# Patient Record
Sex: FEMALE | Race: WHITE | Employment: OTHER | URBAN - METROPOLITAN AREA
[De-identification: names, ages, dates, MRNs, and addresses within clinical notes are randomized per-mention and may not be internally consistent; named-entity substitution may affect disease eponyms.]

---

## 2017-03-15 ENCOUNTER — HOSPITAL ENCOUNTER (OUTPATIENT)
Dept: WOMENS IMAGING | Age: 77
Discharge: HOME | End: 2017-03-15
Attending: FAMILY MEDICINE | Admitting: FAMILY MEDICINE

## 2017-03-15 DIAGNOSIS — Z12.31 VISIT FOR SCREENING MAMMOGRAM: ICD-10-CM

## 2018-03-13 ENCOUNTER — HOSPITAL ENCOUNTER (OUTPATIENT)
Dept: WOMENS IMAGING | Age: 78
Discharge: HOME | End: 2018-03-13
Attending: FAMILY MEDICINE | Admitting: FAMILY MEDICINE

## 2018-03-13 DIAGNOSIS — Z12.39 BREAST CANCER SCREENING: ICD-10-CM

## 2018-08-03 ENCOUNTER — HOSPITAL ENCOUNTER (OUTPATIENT)
Dept: WOMENS IMAGING | Age: 78
Discharge: HOME | End: 2018-08-03
Attending: FAMILY MEDICINE | Admitting: FAMILY MEDICINE

## 2018-08-03 DIAGNOSIS — M81.0 OSTEOPOROSIS, POST-MENOPAUSAL: ICD-10-CM

## 2018-08-03 DIAGNOSIS — N95.1 FEMALE CLIMACTERIC STATE: ICD-10-CM

## 2019-03-30 ENCOUNTER — HOSPITAL ENCOUNTER (OUTPATIENT)
Dept: WOMENS IMAGING | Age: 79
Discharge: HOME | End: 2019-03-30
Payer: MEDICARE

## 2019-03-30 DIAGNOSIS — Z12.39 BREAST CANCER SCREENING: ICD-10-CM

## 2019-03-30 PROCEDURE — 77063 BREAST TOMOSYNTHESIS BI: CPT

## 2020-06-05 ENCOUNTER — HOSPITAL ENCOUNTER (OUTPATIENT)
Dept: WOMENS IMAGING | Age: 80
Discharge: HOME | End: 2020-06-05
Payer: MEDICARE

## 2020-06-05 PROCEDURE — 77067 SCR MAMMO BI INCL CAD: CPT

## 2021-06-12 ENCOUNTER — HOSPITAL ENCOUNTER (OUTPATIENT)
Dept: WOMENS IMAGING | Age: 81
Discharge: HOME | End: 2021-06-12
Payer: MEDICARE

## 2021-06-12 DIAGNOSIS — Z12.31 BREAST CANCER SCREENING BY MAMMOGRAM: ICD-10-CM

## 2021-06-12 PROCEDURE — 77063 BREAST TOMOSYNTHESIS BI: CPT

## 2022-06-16 ENCOUNTER — HOSPITAL ENCOUNTER (OUTPATIENT)
Dept: WOMENS IMAGING | Age: 82
Discharge: HOME | End: 2022-06-16
Payer: MEDICARE

## 2022-06-16 DIAGNOSIS — Z12.31 BREAST CANCER SCREENING BY MAMMOGRAM: ICD-10-CM

## 2022-06-16 PROCEDURE — 77063 BREAST TOMOSYNTHESIS BI: CPT

## 2024-07-02 ENCOUNTER — HOSPITAL ENCOUNTER (OUTPATIENT)
Dept: WOMENS IMAGING | Age: 84
Discharge: HOME | End: 2024-07-02
Payer: MEDICARE

## 2024-07-02 DIAGNOSIS — Z12.31 BREAST CANCER SCREENING BY MAMMOGRAM: ICD-10-CM

## 2024-07-02 PROCEDURE — 77063 BREAST TOMOSYNTHESIS BI: CPT

## 2025-05-26 ENCOUNTER — HOSPITAL ENCOUNTER (INPATIENT)
Age: 85
LOS: 1 days | Discharge: HOME | DRG: 885 | End: 2025-05-28
Attending: HOSPITALIST | Admitting: HOSPITALIST
Payer: MEDICARE

## 2025-05-26 ENCOUNTER — APPOINTMENT (OUTPATIENT)
Dept: CT IMAGING | Age: 85
DRG: 885 | End: 2025-05-26
Payer: MEDICARE

## 2025-05-26 DIAGNOSIS — R44.3 HALLUCINATIONS: Primary | ICD-10-CM

## 2025-05-26 LAB
ANION GAP SERPL CALCULATED.3IONS-SCNC: 15 MMOL/L (ref 3–16)
APAP SERPL-MCNC: <5 UG/ML (ref 10–30)
BASOPHILS # BLD: 0.1 K/UL (ref 0–0.2)
BASOPHILS NFR BLD: 0.7 %
BUN SERPL-MCNC: 19 MG/DL (ref 7–20)
CALCIUM SERPL-MCNC: 9 MG/DL (ref 8.3–10.6)
CHLORIDE SERPL-SCNC: 103 MMOL/L (ref 99–110)
CO2 SERPL-SCNC: 21 MMOL/L (ref 21–32)
CREAT SERPL-MCNC: 0.9 MG/DL (ref 0.6–1.2)
DEPRECATED RDW RBC AUTO: 16.4 % (ref 12.4–15.4)
EOSINOPHIL # BLD: 0.1 K/UL (ref 0–0.6)
EOSINOPHIL NFR BLD: 0.5 %
ETHANOLAMINE SERPL-MCNC: NORMAL MG/DL (ref 0–0.08)
GFR SERPLBLD CREATININE-BSD FMLA CKD-EPI: 62 ML/MIN/{1.73_M2}
GLUCOSE SERPL-MCNC: 120 MG/DL (ref 70–99)
HCT VFR BLD AUTO: 45.7 % (ref 36–48)
HGB BLD-MCNC: 15.1 G/DL (ref 12–16)
LYMPHOCYTES # BLD: 1.9 K/UL (ref 1–5.1)
LYMPHOCYTES NFR BLD: 17.8 %
MCH RBC QN AUTO: 29.1 PG (ref 26–34)
MCHC RBC AUTO-ENTMCNC: 33 G/DL (ref 31–36)
MCV RBC AUTO: 88.3 FL (ref 80–100)
MONOCYTES # BLD: 0.9 K/UL (ref 0–1.3)
MONOCYTES NFR BLD: 8.1 %
NEUTROPHILS # BLD: 7.9 K/UL (ref 1.7–7.7)
NEUTROPHILS NFR BLD: 72.9 %
PLATELET # BLD AUTO: 168 K/UL (ref 135–450)
PMV BLD AUTO: 9.8 FL (ref 5–10.5)
POTASSIUM SERPL-SCNC: 3.6 MMOL/L (ref 3.5–5.1)
RBC # BLD AUTO: 5.17 M/UL (ref 4–5.2)
SALICYLATES SERPL-MCNC: <0.5 MG/DL (ref 15–30)
SODIUM SERPL-SCNC: 139 MMOL/L (ref 136–145)
TSH SERPL DL<=0.005 MIU/L-ACNC: 1.31 UIU/ML (ref 0.27–4.2)
WBC # BLD AUTO: 10.8 K/UL (ref 4–11)

## 2025-05-26 PROCEDURE — 84443 ASSAY THYROID STIM HORMONE: CPT

## 2025-05-26 PROCEDURE — 80143 DRUG ASSAY ACETAMINOPHEN: CPT

## 2025-05-26 PROCEDURE — 82140 ASSAY OF AMMONIA: CPT

## 2025-05-26 PROCEDURE — 82077 ASSAY SPEC XCP UR&BREATH IA: CPT

## 2025-05-26 PROCEDURE — 70450 CT HEAD/BRAIN W/O DYE: CPT

## 2025-05-26 PROCEDURE — 36415 COLL VENOUS BLD VENIPUNCTURE: CPT

## 2025-05-26 PROCEDURE — 85025 COMPLETE CBC W/AUTO DIFF WBC: CPT

## 2025-05-26 PROCEDURE — 80048 BASIC METABOLIC PNL TOTAL CA: CPT

## 2025-05-26 PROCEDURE — 99285 EMERGENCY DEPT VISIT HI MDM: CPT

## 2025-05-26 PROCEDURE — 80179 DRUG ASSAY SALICYLATE: CPT

## 2025-05-26 RX ORDER — TRIAMTERENE AND HYDROCHLOROTHIAZIDE 37.5; 25 MG/1; MG/1
1 TABLET ORAL DAILY
COMMUNITY

## 2025-05-26 RX ORDER — ASPIRIN 81 MG/1
81 TABLET, CHEWABLE ORAL DAILY
COMMUNITY

## 2025-05-26 RX ORDER — ALLOPURINOL 100 MG/1
200 TABLET ORAL DAILY
COMMUNITY

## 2025-05-26 RX ORDER — SIMVASTATIN 20 MG
20 TABLET ORAL NIGHTLY
COMMUNITY

## 2025-05-26 RX ORDER — CALCIUM CARBONATE 500(1250)
500 TABLET ORAL DAILY
COMMUNITY

## 2025-05-26 RX ORDER — TRAZODONE HYDROCHLORIDE 50 MG/1
50 TABLET ORAL NIGHTLY
Status: ON HOLD | COMMUNITY
End: 2025-05-28 | Stop reason: HOSPADM

## 2025-05-26 ASSESSMENT — PAIN - FUNCTIONAL ASSESSMENT: PAIN_FUNCTIONAL_ASSESSMENT: NONE - DENIES PAIN

## 2025-05-27 PROBLEM — E53.8 VITAMIN B12 DEFICIENCY: Status: ACTIVE | Noted: 2025-05-27

## 2025-05-27 PROBLEM — R44.3 HALLUCINATIONS: Status: ACTIVE | Noted: 2025-05-27

## 2025-05-27 PROBLEM — I10 HYPERTENSION: Status: ACTIVE | Noted: 2025-05-27

## 2025-05-27 PROBLEM — E55.9 VITAMIN D DEFICIENCY: Status: ACTIVE | Noted: 2025-05-27

## 2025-05-27 LAB
25(OH)D3 SERPL-MCNC: 22.1 NG/ML
AMMONIA PLAS-SCNC: 31 UMOL/L (ref 11–51)
AMPHETAMINES UR QL SCN>1000 NG/ML: NORMAL
BARBITURATES UR QL SCN>200 NG/ML: NORMAL
BENZODIAZ UR QL SCN>200 NG/ML: NORMAL
BILIRUB UR QL STRIP.AUTO: NEGATIVE
CANNABINOIDS UR QL SCN>50 NG/ML: NORMAL
CLARITY UR: CLEAR
COCAINE UR QL SCN: NORMAL
COLOR UR: YELLOW
DRUG SCREEN COMMENT UR-IMP: NORMAL
FENTANYL SCREEN, URINE: NORMAL
GLUCOSE UR STRIP.AUTO-MCNC: NEGATIVE MG/DL
HGB UR QL STRIP.AUTO: NEGATIVE
KETONES UR STRIP.AUTO-MCNC: ABNORMAL MG/DL
LEUKOCYTE ESTERASE UR QL STRIP.AUTO: NEGATIVE
METHADONE UR QL SCN>300 NG/ML: NORMAL
NITRITE UR QL STRIP.AUTO: NEGATIVE
OPIATES UR QL SCN>300 NG/ML: NORMAL
OXYCODONE UR QL SCN: NORMAL
PCP UR QL SCN>25 NG/ML: NORMAL
PH UR STRIP.AUTO: 6 [PH] (ref 5–8)
PH UR STRIP: 6 [PH]
PROT UR STRIP.AUTO-MCNC: NEGATIVE MG/DL
SP GR UR STRIP.AUTO: 1.03 (ref 1–1.03)
UA COMPLETE W REFLEX CULTURE PNL UR: ABNORMAL
UA DIPSTICK W REFLEX MICRO PNL UR: ABNORMAL
URN SPEC COLLECT METH UR: ABNORMAL
UROBILINOGEN UR STRIP-ACNC: 1 E.U./DL
VIT B12 SERPL-MCNC: 241 PG/ML (ref 211–911)

## 2025-05-27 PROCEDURE — 94760 N-INVAS EAR/PLS OXIMETRY 1: CPT

## 2025-05-27 PROCEDURE — G0378 HOSPITAL OBSERVATION PER HR: HCPCS

## 2025-05-27 PROCEDURE — 36415 COLL VENOUS BLD VENIPUNCTURE: CPT

## 2025-05-27 PROCEDURE — 80307 DRUG TEST PRSMV CHEM ANLYZR: CPT

## 2025-05-27 PROCEDURE — 99222 1ST HOSP IP/OBS MODERATE 55: CPT | Performed by: NURSE PRACTITIONER

## 2025-05-27 PROCEDURE — 82306 VITAMIN D 25 HYDROXY: CPT

## 2025-05-27 PROCEDURE — 81003 URINALYSIS AUTO W/O SCOPE: CPT

## 2025-05-27 PROCEDURE — 2500000003 HC RX 250 WO HCPCS: Performed by: HOSPITALIST

## 2025-05-27 PROCEDURE — 6370000000 HC RX 637 (ALT 250 FOR IP): Performed by: HOSPITALIST

## 2025-05-27 PROCEDURE — 82607 VITAMIN B-12: CPT

## 2025-05-27 RX ORDER — ENOXAPARIN SODIUM 100 MG/ML
40 INJECTION SUBCUTANEOUS DAILY
Status: DISCONTINUED | OUTPATIENT
Start: 2025-05-27 | End: 2025-05-28 | Stop reason: HOSPADM

## 2025-05-27 RX ORDER — HYDROXYZINE HYDROCHLORIDE 25 MG/1
25 TABLET, FILM COATED ORAL ONCE AS NEEDED
Status: DISCONTINUED | OUTPATIENT
Start: 2025-05-27 | End: 2025-05-28 | Stop reason: HOSPADM

## 2025-05-27 RX ORDER — POLYETHYLENE GLYCOL 3350 17 G/17G
17 POWDER, FOR SOLUTION ORAL DAILY PRN
Status: DISCONTINUED | OUTPATIENT
Start: 2025-05-27 | End: 2025-05-28 | Stop reason: HOSPADM

## 2025-05-27 RX ORDER — MAGNESIUM SULFATE IN WATER 40 MG/ML
2000 INJECTION, SOLUTION INTRAVENOUS PRN
Status: DISCONTINUED | OUTPATIENT
Start: 2025-05-27 | End: 2025-05-28 | Stop reason: HOSPADM

## 2025-05-27 RX ORDER — VITAMIN B COMPLEX
2000 TABLET ORAL DAILY
Status: DISCONTINUED | OUTPATIENT
Start: 2025-05-27 | End: 2025-05-28 | Stop reason: HOSPADM

## 2025-05-27 RX ORDER — SODIUM CHLORIDE 9 MG/ML
INJECTION, SOLUTION INTRAVENOUS PRN
Status: DISCONTINUED | OUTPATIENT
Start: 2025-05-27 | End: 2025-05-28 | Stop reason: HOSPADM

## 2025-05-27 RX ORDER — CALCIUM CARBONATE 500(1250)
500 TABLET ORAL DAILY
Status: DISCONTINUED | OUTPATIENT
Start: 2025-05-27 | End: 2025-05-28 | Stop reason: HOSPADM

## 2025-05-27 RX ORDER — POTASSIUM CHLORIDE 7.45 MG/ML
10 INJECTION INTRAVENOUS PRN
Status: DISCONTINUED | OUTPATIENT
Start: 2025-05-27 | End: 2025-05-28 | Stop reason: HOSPADM

## 2025-05-27 RX ORDER — POTASSIUM CHLORIDE 1500 MG/1
40 TABLET, EXTENDED RELEASE ORAL PRN
Status: DISCONTINUED | OUTPATIENT
Start: 2025-05-27 | End: 2025-05-28 | Stop reason: HOSPADM

## 2025-05-27 RX ORDER — SODIUM CHLORIDE 0.9 % (FLUSH) 0.9 %
5-40 SYRINGE (ML) INJECTION PRN
Status: DISCONTINUED | OUTPATIENT
Start: 2025-05-27 | End: 2025-05-28 | Stop reason: HOSPADM

## 2025-05-27 RX ORDER — ACETAMINOPHEN 650 MG/1
650 SUPPOSITORY RECTAL EVERY 6 HOURS PRN
Status: DISCONTINUED | OUTPATIENT
Start: 2025-05-27 | End: 2025-05-28 | Stop reason: HOSPADM

## 2025-05-27 RX ORDER — ATORVASTATIN CALCIUM 10 MG/1
10 TABLET, FILM COATED ORAL NIGHTLY
Status: DISCONTINUED | OUTPATIENT
Start: 2025-05-27 | End: 2025-05-28 | Stop reason: HOSPADM

## 2025-05-27 RX ORDER — SODIUM CHLORIDE 0.9 % (FLUSH) 0.9 %
5-40 SYRINGE (ML) INJECTION EVERY 12 HOURS SCHEDULED
Status: DISCONTINUED | OUTPATIENT
Start: 2025-05-27 | End: 2025-05-28 | Stop reason: HOSPADM

## 2025-05-27 RX ORDER — ONDANSETRON 2 MG/ML
4 INJECTION INTRAMUSCULAR; INTRAVENOUS EVERY 6 HOURS PRN
Status: DISCONTINUED | OUTPATIENT
Start: 2025-05-27 | End: 2025-05-28 | Stop reason: HOSPADM

## 2025-05-27 RX ORDER — ASPIRIN 81 MG/1
81 TABLET, CHEWABLE ORAL DAILY
Status: DISCONTINUED | OUTPATIENT
Start: 2025-05-27 | End: 2025-05-28 | Stop reason: HOSPADM

## 2025-05-27 RX ORDER — ONDANSETRON 4 MG/1
4 TABLET, ORALLY DISINTEGRATING ORAL EVERY 8 HOURS PRN
Status: DISCONTINUED | OUTPATIENT
Start: 2025-05-27 | End: 2025-05-28 | Stop reason: HOSPADM

## 2025-05-27 RX ORDER — ACETAMINOPHEN 325 MG/1
650 TABLET ORAL EVERY 6 HOURS PRN
Status: DISCONTINUED | OUTPATIENT
Start: 2025-05-27 | End: 2025-05-28 | Stop reason: HOSPADM

## 2025-05-27 RX ORDER — ALLOPURINOL 100 MG/1
200 TABLET ORAL DAILY
Status: DISCONTINUED | OUTPATIENT
Start: 2025-05-27 | End: 2025-05-28 | Stop reason: HOSPADM

## 2025-05-27 RX ADMIN — Medication 2000 UNITS: at 10:46

## 2025-05-27 RX ADMIN — SODIUM CHLORIDE, PRESERVATIVE FREE 10 ML: 5 INJECTION INTRAVENOUS at 08:34

## 2025-05-27 RX ADMIN — CALCIUM 500 MG: 500 TABLET ORAL at 10:46

## 2025-05-27 RX ADMIN — SERTRALINE 50 MG: 50 TABLET, FILM COATED ORAL at 10:46

## 2025-05-27 RX ADMIN — ATORVASTATIN CALCIUM 10 MG: 10 TABLET, FILM COATED ORAL at 20:25

## 2025-05-27 RX ADMIN — ASPIRIN 81 MG: 81 TABLET, CHEWABLE ORAL at 10:46

## 2025-05-27 RX ADMIN — SODIUM CHLORIDE, PRESERVATIVE FREE 10 ML: 5 INJECTION INTRAVENOUS at 20:26

## 2025-05-27 RX ADMIN — ALLOPURINOL 200 MG: 100 TABLET ORAL at 20:25

## 2025-05-27 ASSESSMENT — PAIN SCALES - GENERAL
PAINLEVEL_OUTOF10: 0

## 2025-05-27 ASSESSMENT — PAIN - FUNCTIONAL ASSESSMENT: PAIN_FUNCTIONAL_ASSESSMENT: 0-10

## 2025-05-27 NOTE — PLAN OF CARE
Problem: Discharge Planning  Goal: Discharge to home or other facility with appropriate resources  Outcome: Progressing  Flowsheets  Taken 5/27/2025 0158  Discharge to home or other facility with appropriate resources:   Identify barriers to discharge with patient and caregiver   Arrange for needed discharge resources and transportation as appropriate  Taken 5/27/2025 0142  Discharge to home or other facility with appropriate resources:   Identify barriers to discharge with patient and caregiver   Arrange for needed discharge resources and transportation as appropriate     Problem: Pain  Goal: Verbalizes/displays adequate comfort level or baseline comfort level  Outcome: Progressing     Problem: Safety - Adult  Goal: Free from fall injury  Outcome: Progressing     Problem: ABCDS Injury Assessment  Goal: Absence of physical injury  Outcome: Progressing     Problem: Skin/Tissue Integrity - Adult  Goal: Skin integrity remains intact  Outcome: Progressing  Flowsheets (Taken 5/27/2025 0142)  Skin Integrity Remains Intact:   Monitor for areas of redness and/or skin breakdown   Assess vascular access sites hourly

## 2025-05-27 NOTE — CONSULTS
Neurology Consult Note  Reason for Consult: hallucinations    Chief complaint: feel threatened in home    Chelly Aparicio MD asked me to see Rosalia Grant in consultation for evaluation of hallucinations    History of Present Illness:  Rosalia Grant is a 84 y.o. female who presents with confusion, fear, hallucinations.     I obtained my information via interview w/ the patient and her daughter at the bedside, supplemented by chart review.     Around February/March, the patient and her daughter started to notice some changes at night.  When this started, the patient had sold her house and moved into a [employer] w/ her daughter.  Her daughter then went back to work in the office full time.  Apparently they found out that the previous owner had passed in the home which made her weary.      The patient started having some trouble at night.  He was having nightmares and night terrors.  She would visually hallucinate.  Apparently at one point she was convinced that someone was in the home and rearranged the furniture when she had been sleeping.  Her daughter confirmed the furniture was where it should be.  She has been feeling threatened to even be in her home.      Initially this had improved.  In recent weeks they started to pick back up.  She has been seeing her PCP about this.  She was given a prescription of Trazodone which knocked her out.  She ended up coming to the ED yesterday to be evaluated.    Currently she feels OK.  She says that she had a couple of episodes where her entire body felt warm which was odd for her.      She is otherwise a highly functional person (does finances, cooks meals, laundry, etc.).    Medical History:  Past Medical History:   Diagnosis Date    Cancer (HCC) 2024    Skin cancer removed    Hyperlipidemia     Hypertension      Past Surgical History:   Procedure Laterality Date    APPENDECTOMY      CHOLECYSTECTOMY      COLONOSCOPY      HYSTERECTOMY (CERVIX STATUS UNKNOWN)      SKIN

## 2025-05-27 NOTE — PROGRESS NOTES
4 Eyes Skin Assessment     NAME:  Rosalia Grant  YOB: 1940  MEDICAL RECORD NUMBER:  [id number]    The patient is being assessed for  Admission    I agree that at least one RN has performed a thorough Head to Toe Skin Assessment on the patient. ALL assessment sites listed below have been assessed.      Areas assessed by both nurses:    Head, Face, Ears, Shoulders, Back, Chest, Arms, Elbows, Hands, Sacrum. Buttock, Coccyx, Ischium, Legs. Feet and Heels, and Under Medical Devices         Does the Patient have a Wound? No noted wound(s)       Mayank Prevention initiated by RN: No  Wound Care Orders initiated by RN: No    Pressure Injury (Stage 3,4, Unstageable, DTI, NWPT, and Complex wounds) if present, place Wound referral order by RN under : No    New Ostomies, if present place, Ostomy referral order under : No     Nurse 1 eSignature: Electronically signed by Vu Baltazar RN on 5/27/25 at 3:30 AM EDT    **SHARE this note so that the co-signing nurse can place an eSignature**    Nurse 2 eSignature: Electronically signed by Heather Torres RN on 5/27/25 at 6:32 AM EDT

## 2025-05-27 NOTE — ED NOTES
ED TO INPATIENT SBAR HANDOFF    Patient Name: Rosalia Grant   Preferred Name: Rosalia  : 1940  84 y.o.   Family/Caregiver Present: no   Code Status Order: No Order  PO Status: NPO:No  Telemetry Order: No  C-SSRS:    Sitter no   Restraints:     Sepsis Risk Score      Situation  Chief Complaint   Patient presents with    Hallucinations     Hallucinations since march, increasing this month, and the greatly increased over the last few days. Primary has been relating this to anxiety. Patient reports seeing men come into the house drugging her. She states she has been told this is her imagination. Patient started on zoloft today.      Brief Description of Patient's Condition: Please see triage note  Mental Status: oriented and alert  Arrived from:Home  Imaging:   CT HEAD WO CONTRAST   Final Result   No acute intracranial abnormality.           Abnormal labs:   Abnormal Labs Reviewed   CBC WITH AUTO DIFFERENTIAL - Abnormal; Notable for the following components:       Result Value    RDW 16.4 (*)     Neutrophils Absolute 7.9 (*)     All other components within normal limits   BASIC METABOLIC PANEL W/ REFLEX TO MG FOR LOW K - Abnormal; Notable for the following components:    Glucose 120 (*)     All other components within normal limits   SALICYLATE LEVEL - Abnormal; Notable for the following components:    Salicylate Lvl <0.5 (*)     All other components within normal limits   ACETAMINOPHEN LEVEL - Abnormal; Notable for the following components:    Acetaminophen Level <5 (*)     All other components within normal limits       Background  Allergies:   Allergies   Allergen Reactions    Amoxicillin     Cefuroxime     Cefuroxime Axetil Rash    Doxycycline Rash     Very mild rash and may not had been due to Doxy     History: No past medical history on file.    Assessment  Vitals: MEWS Score: 1  Level of Consciousness: Alert (0)   Vitals:    252   BP: (!) 158/77   Pulse: 87   Resp: 15   Temp: 97.7 °F (36.5 °C)

## 2025-05-27 NOTE — H&P
V2.0  History and Physical      Name:  Rosalia Grant /Age/Sex: 1940  (84 y.o. female)   MRN & CSN:  [id number] & [id number] Encounter Date/Time: 2025 12:43 AM EDT   Location:  [place] [address]/[facility] PCP: Valery Perez MD       Hospital Day: 2    Assessment and Plan:   Rosalia Grant is a 84 y.o. female with a pmh of hypertension, vitamin D deficiency who presents with Hallucinations    Hospital Problems           Last Modified POA    * (Principal) Hallucinations 2025 Yes    Vitamin D deficiency 2025 Yes    Hypertension 2025 Yes    Vitamin B12 deficiency 2025 Yes       Plan:  Get MRI of brain.  Neurology consult  TSH and ammonia is level.  Check vitamin D level check vitamin B12  Resume home blood pressure medication when verified.  Hypertension-blood pressure is not within goal.  Resume home blood pressure medication when verified.      Advance care planning:  Advanced care planning was discussed with patient for a total of  16 minutes.  Full code, DNR CC, DNR CCA, power of  and living will were discussed.  Patient elected to be a full code.   Disposition:   Current Living situation: Home  Expected Disposition: Home  Estimated D/C: 3 days  Diet Regular diet   DVT Prophylaxis [x] Lovenox, []  Heparin, [] SCDs, [] Ambulation,  [] Eliquis, [] Xarelto, [] Coumadin   Code Status No Order   Surrogate Decision Maker/ POA DaughterHolley.     Personally reviewed Lab Studies and Imaging     Discussed management of the case with  ED provider who recommended admission        Imaging that was interpreted personally includes CT head without con and results no acute pathology            History from:     patient    History of Present Illness:     Chief Complaint: Hallucination  Rosalia Grant is a 84 y.o. female with a pmh of hypertension, and vitamin D deficiency who presents with hallucinations.  Also patient is with bizarre thoughts like she has been set up.  She has been having bad

## 2025-05-27 NOTE — ACP (ADVANCE CARE PLANNING)
Advance Care Planning   Healthcare Decision Maker:    Primary Decision Maker: Holley Grant - Andrew - [phone]      Today we documented Decision Maker(s) consistent with Legal Next of Kin hierarchy.       OANH Arteaga [facility]  [phone]  Electronically signed by OANH Sawyer on 5/27/2025 at 3:26 PM

## 2025-05-27 NOTE — ED PROVIDER NOTES
[facility] 4W MED SURG  EMERGENCY DEPARTMENT ENCOUNTER        Pt Name: Rosalia Grant  MRN: [id number]  Birthdate 1940  Date of evaluation: 5/26/2025  Provider: JOCELYN Lyon  PCP: Valery Perez MD  Note Started: 9:25 PM EDT 5/26/25      JASON. I have evaluated this patient.        CHIEF COMPLAINT       Chief Complaint   Patient presents with    Hallucinations     Hallucinations since march, increasing this month, and the greatly increased over the last few days. Primary has been relating this to anxiety. Patient reports seeing men come into the house drugging her. She states she has been told this is her imagination. Patient started on zoloft today.        HISTORY OF PRESENT ILLNESS: 1 or more Elements     History From: patient       Rosalia Grant is a 84 y.o. female with past medical history of hypertension hyperlipidemia osteopenia and gout who presents for evaluation of anxiety and hallucinations.     Nursing Notes were all reviewed and agreed with or any disagreements were addressed in the HPI.    REVIEW OF SYSTEMS :      Review of Systems    Positives and Pertinent negatives as per HPI.     SURGICAL HISTORY     Past Surgical History:   Procedure Laterality Date    APPENDECTOMY      CHOLECYSTECTOMY      COLONOSCOPY      HYSTERECTOMY (CERVIX STATUS UNKNOWN)      SKIN BIOPSY         CURRENTMEDICATIONS       Current Discharge Medication List        CONTINUE these medications which have NOT CHANGED    Details   allopurinol (ZYLOPRIM) 100 MG tablet Take 2 tablets by mouth daily      sertraline (ZOLOFT) 50 MG tablet Take 1 tablet by mouth daily      simvastatin (ZOCOR) 20 MG tablet Take 1 tablet by mouth nightly      triamterene-hydroCHLOROthiazide (MAXZIDE-25) 37.5-25 MG per tablet Take 1 tablet by mouth daily      psyllium (METAMUCIL) 58.6 % packet Take 1 packet by mouth daily      calcium carbonate (OSCAL) 500 MG TABS tablet Take 1 tablet by mouth daily      vitamin D (CHOLECALCIFEROL) 25 MCG

## 2025-05-27 NOTE — PROGRESS NOTES
Patient admitted to room 4114. A & O x4. VSS on RA. Admission questions and assessment completed. Pt. oriented to room, floor policy and call light. All patient questions answered. UA sample collected and sent to lab. Pt. has no complaints of pain. States that she hears voices sometimes but the voices are not telling her to hurt herself or anyone else. Standard safety measures and fall risk precautions initiated. Bed alarm in place, bedside table and call light within reach.

## 2025-05-27 NOTE — PROGRESS NOTES
Patient seen and examined at bedside family at the bedside per family patient having complaint of hallucinations  Will get psychiatry evaluation follow-up neurology recommendation    Follow-up MRI    Ativan prn before MRI

## 2025-05-27 NOTE — PROGRESS NOTES
Pt called out stated she just had a warm feeling all over her body, pt stated to have a headache and feel nausea, pt said she had this feeling before, it comes and goes, MD OLIVIER ordered a one time does of atarax to help with anxiety, pt hesitant to try it

## 2025-05-27 NOTE — PLAN OF CARE
Problem: Discharge Planning  Goal: Discharge to home or other facility with appropriate resources  5/27/2025 1258 by Vani Marley, RN  Outcome: Progressing  5/27/2025 0534 by Vu Baltazar, RN  Outcome: Progressing  Flowsheets  Taken 5/27/2025 0158  Discharge to home or other facility with appropriate resources:   Identify barriers to discharge with patient and caregiver   Arrange for needed discharge resources and transportation as appropriate  Taken 5/27/2025 0142  Discharge to home or other facility with appropriate resources:   Identify barriers to discharge with patient and caregiver   Arrange for needed discharge resources and transportation as appropriate     Problem: Safety - Adult  Goal: Free from fall injury  5/27/2025 0534 by Vu Baltazar, RN  Outcome: Progressing

## 2025-05-28 ENCOUNTER — APPOINTMENT (OUTPATIENT)
Dept: MRI IMAGING | Age: 85
DRG: 885 | End: 2025-05-28
Payer: MEDICARE

## 2025-05-28 VITALS
TEMPERATURE: 97.6 F | RESPIRATION RATE: 16 BRPM | WEIGHT: 178.13 LBS | SYSTOLIC BLOOD PRESSURE: 121 MMHG | OXYGEN SATURATION: 99 % | BODY MASS INDEX: 30.41 KG/M2 | DIASTOLIC BLOOD PRESSURE: 67 MMHG | HEART RATE: 82 BPM | HEIGHT: 64 IN

## 2025-05-28 LAB
ANION GAP SERPL CALCULATED.3IONS-SCNC: 14 MMOL/L (ref 3–16)
BASOPHILS # BLD: 0.1 K/UL (ref 0–0.2)
BASOPHILS NFR BLD: 0.9 %
BUN SERPL-MCNC: 15 MG/DL (ref 7–20)
CALCIUM SERPL-MCNC: 8.7 MG/DL (ref 8.3–10.6)
CHLORIDE SERPL-SCNC: 105 MMOL/L (ref 99–110)
CO2 SERPL-SCNC: 23 MMOL/L (ref 21–32)
CREAT SERPL-MCNC: 0.6 MG/DL (ref 0.6–1.2)
DEPRECATED RDW RBC AUTO: 16.1 % (ref 12.4–15.4)
EOSINOPHIL # BLD: 0.1 K/UL (ref 0–0.6)
EOSINOPHIL NFR BLD: 1.7 %
GFR SERPLBLD CREATININE-BSD FMLA CKD-EPI: 87 ML/MIN/{1.73_M2}
GLUCOSE SERPL-MCNC: 90 MG/DL (ref 70–99)
HCT VFR BLD AUTO: 44.1 % (ref 36–48)
HGB BLD-MCNC: 14.3 G/DL (ref 12–16)
LYMPHOCYTES # BLD: 2.3 K/UL (ref 1–5.1)
LYMPHOCYTES NFR BLD: 29.2 %
MAGNESIUM SERPL-MCNC: 2.21 MG/DL (ref 1.8–2.4)
MCH RBC QN AUTO: 28.8 PG (ref 26–34)
MCHC RBC AUTO-ENTMCNC: 32.4 G/DL (ref 31–36)
MCV RBC AUTO: 89 FL (ref 80–100)
MONOCYTES # BLD: 0.7 K/UL (ref 0–1.3)
MONOCYTES NFR BLD: 8.8 %
NEUTROPHILS # BLD: 4.8 K/UL (ref 1.7–7.7)
NEUTROPHILS NFR BLD: 59.4 %
PLATELET # BLD AUTO: 136 K/UL (ref 135–450)
PMV BLD AUTO: 10.2 FL (ref 5–10.5)
POTASSIUM SERPL-SCNC: 3.3 MMOL/L (ref 3.5–5.1)
RBC # BLD AUTO: 4.95 M/UL (ref 4–5.2)
SODIUM SERPL-SCNC: 142 MMOL/L (ref 136–145)
WBC # BLD AUTO: 8 K/UL (ref 4–11)

## 2025-05-28 PROCEDURE — G0378 HOSPITAL OBSERVATION PER HR: HCPCS

## 2025-05-28 PROCEDURE — 6370000000 HC RX 637 (ALT 250 FOR IP): Performed by: HOSPITALIST

## 2025-05-28 PROCEDURE — 6360000002 HC RX W HCPCS: Performed by: HOSPITALIST

## 2025-05-28 PROCEDURE — 70551 MRI BRAIN STEM W/O DYE: CPT

## 2025-05-28 PROCEDURE — 83735 ASSAY OF MAGNESIUM: CPT

## 2025-05-28 PROCEDURE — 94760 N-INVAS EAR/PLS OXIMETRY 1: CPT

## 2025-05-28 PROCEDURE — 36415 COLL VENOUS BLD VENIPUNCTURE: CPT

## 2025-05-28 PROCEDURE — 1200000000 HC SEMI PRIVATE

## 2025-05-28 PROCEDURE — 85025 COMPLETE CBC W/AUTO DIFF WBC: CPT

## 2025-05-28 PROCEDURE — 80048 BASIC METABOLIC PNL TOTAL CA: CPT

## 2025-05-28 PROCEDURE — 96372 THER/PROPH/DIAG INJ SC/IM: CPT

## 2025-05-28 RX ORDER — ARIPIPRAZOLE 2 MG/1
2 TABLET ORAL DAILY
Qty: 30 TABLET | Refills: 1 | Status: SHIPPED | OUTPATIENT
Start: 2025-05-28 | End: 2025-06-27

## 2025-05-28 RX ADMIN — Medication 2000 UNITS: at 09:24

## 2025-05-28 RX ADMIN — ENOXAPARIN SODIUM 40 MG: 100 INJECTION SUBCUTANEOUS at 09:24

## 2025-05-28 RX ADMIN — ASPIRIN 81 MG: 81 TABLET, CHEWABLE ORAL at 09:24

## 2025-05-28 RX ADMIN — POTASSIUM CHLORIDE 40 MEQ: 1500 TABLET, EXTENDED RELEASE ORAL at 09:24

## 2025-05-28 RX ADMIN — CALCIUM 500 MG: 500 TABLET ORAL at 09:24

## 2025-05-28 RX ADMIN — SERTRALINE 50 MG: 50 TABLET, FILM COATED ORAL at 09:24

## 2025-05-28 NOTE — DISCHARGE SUMMARY
Hospital Medicine Discharge Summary    Patient ID: Rosalia Grant      Patient's PCP: Valery Perez MD    Admit Date: 5/26/2025     Discharge Date:      Admitting Physician: Chelly Kenny MD     Discharge Physician: Chelly Kenny MD     Discharge Diagnoses:       Active Hospital Problems    Diagnosis     Hallucinations [R44.3]     Vitamin D deficiency [E55.9]     Hypertension [I10]     Vitamin B12 deficiency [E53.8]        The patient was seen and examined on day of discharge and this discharge summary is in conjunction with any daily progress note from day of discharge.    Hospital Course:     Rosalia Grant is a 84 y.o. female with a pmh of hypertension, and vitamin D deficiency who presents with hallucinations.  Also patient is with bizarre thoughts like she has been set up.  She has been having bad dreams.  She was put on trazodone for for his symptoms but that made him very sedated so she has been started on Zoloft.     Psychiatric recommendation as below    Unspecified Psychosis     Per History:  Sleep Terrors  Vivid Dreams     Plan:  Please refer the patient to a psychiatrist and therapist for continued care and support after discharge, The patient is cleared to be discharged from a psychiatric point of view, when medically appropriate.  Patient does not meet criteria for a psychiatric hold at this time, and Ongoing medical management and stabilization per primary team   Medical co-morbidities: Management per medical providers, appreciate assistance.  Medication Recommendations:   -obtain EKG - QTc monitoring, lipid panel, a1c, folate, thiamine  -sertraline 50 mg po daily - mood  -consider aripiprazole 2 mg po daily - psychosis - titrate as appropriate - pt currently considering, but not consenting to this medication at this time  Reviewed treatment plan with patient including risks, benefits, alternatives, and side effects of medications, and any/all black box warnings. Patient

## 2025-05-28 NOTE — PROGRESS NOTES
Physician Progress Note      PATIENT:               SANTA HARRIS  CSN #:                  [id number]  :                       1940  ADMIT DATE:       2025 9:20 PM  DISCH DATE:  RESPONDING  PROVIDER #:        Chelly Kenny MD          QUERY TEXT:    Vitamin B12 deficiency is documented in the H&P.   Please provide additional   clinical indicators supportive of your documentation. Or please document if   the diagnosis of Vitamin B12 deficiency has been ruled out after study.    The clinical indicators include:  - Admitted with increasing visual and auditory hallucination x 3 months  - Patient has history of Vitamin B12 deficiency  - Vitamin B12 level 241  Options provided:  -- Vitamin B12 deficiency present as evidenced by, Please document evidence.  -- Vitamin B12 deficiency was ruled out after study  -- Other - I will add my own diagnosis  -- Disagree - Not applicable / Not valid  -- Disagree - Clinically unable to determine / Unknown  -- Refer to Clinical Documentation Reviewer    PROVIDER RESPONSE TEXT:    Vitamin B 12 deficiency was ruled out after study.    Query created by: Lexis Devine on 2025 10:48 AM      Electronically signed by:  Chelly Kenny MD 2025 10:52 AM

## 2025-05-28 NOTE — PLAN OF CARE
Problem: Discharge Planning  Goal: Discharge to home or other facility with appropriate resources  5/27/2025 2118 by Olena Lala RN  Outcome: Progressing  5/27/2025 1258 by Vani Marley RN  Outcome: Progressing     Problem: Pain  Goal: Verbalizes/displays adequate comfort level or baseline comfort level  5/27/2025 2118 by Olena Lala RN  Outcome: Progressing  5/27/2025 1258 by Vani Marley RN  Outcome: Progressing     Problem: Safety - Adult  Goal: Free from fall injury  Outcome: Progressing     Problem: ABCDS Injury Assessment  Goal: Absence of physical injury  Outcome: Progressing     Problem: Skin/Tissue Integrity - Adult  Goal: Skin integrity remains intact  Outcome: Progressing

## 2025-05-28 NOTE — VIRTUAL HEALTH
MD    ondansetron (ZOFRAN-ODT) disintegrating tablet 4 mg, 4 mg, Oral, Q8H PRN **OR** ondansetron (ZOFRAN) injection 4 mg, 4 mg, IntraVENous, Q6H PRN, Jose Antonio MD    polyethylene glycol (GLYCOLAX) packet 17 g, 17 g, Oral, Daily PRN, Jose Antonio MD    acetaminophen (TYLENOL) tablet 650 mg, 650 mg, Oral, Q6H PRN **OR** acetaminophen (TYLENOL) suppository 650 mg, 650 mg, Rectal, Q6H PRN, Jose Antonio MD    allopurinol (ZYLOPRIM) tablet 200 mg, 200 mg, Oral, Daily, Chelly Kenny MD, 200 mg at 05/27/25 2025    aspirin chewable tablet 81 mg, 81 mg, Oral, Daily, Chelly Kenny MD, 81 mg at 05/27/25 1046    psyllium husk-aspartame (METAMUCIL FIBER) packet 1 packet, 1 packet, Oral, Daily, Chelly Kenny MD    calcium elemental (OSCAL) tablet 500 mg, 500 mg, Oral, Daily, Chelly Kenny MD, 500 mg at 05/27/25 1046    sertraline (ZOLOFT) tablet 50 mg, 50 mg, Oral, Daily, Chelly Kenny MD, 50 mg at 05/27/25 1046    atorvastatin (LIPITOR) tablet 10 mg, 10 mg, Oral, Nightly, Chelly Kenny MD, 10 mg at 05/27/25 2025    Vitamin D (CHOLECALCIFEROL) tablet 2,000 Units, 2,000 Units, Oral, Daily, Chelly Kenny MD, 2,000 Units at 05/27/25 1046    hydrOXYzine HCl (ATARAX) tablet 25 mg, 25 mg, Oral, Once PRN, Chelly Kenny MD    Vital signs:   Vitals:    05/28/25 0845   BP:    Pulse: 82   Resp: 16   Temp:    SpO2: 99%       Labs:   Recent Results (from the past 48 hours)   CBC with Auto Differential    Collection Time: 05/26/25 10:37 PM   Result Value Ref Range    WBC 10.8 4.0 - 11.0 K/uL    RBC 5.17 4.00 - 5.20 M/uL    Hemoglobin 15.1 12.0 - 16.0 g/dL    Hematocrit 45.7 36.0 - 48.0 %    MCV 88.3 80.0 - 100.0 fL    MCH 29.1 26.0 - 34.0 pg    MCHC 33.0 31.0 - 36.0 g/dL    RDW 16.4 (H) 12.4 - 15.4 %    Platelets 168 135 - 450 K/uL    MPV 9.8 5.0 - 10.5 fL    Neutrophils % 72.9 %    Lymphocytes % 17.8 %    Monocytes % 8.1 %    Eosinophils % 0.5 %    Basophils % 0.7 %    
care and support after discharge and Ongoing medical management and stabilization per primary team   Medical co-morbidities: Management per medical providers, appreciate assistance.  Medication Recommendations:   -obtain EKG - QTc monitoring, lipid panel, a1c, folate, thiamine  -sertraline 50 mg po daily - mood  -consider aripiprazole 2 mg po daily - psychosis - titrate as appropriate - pt currently considering, but not consenting to this medication at this time  Reviewed treatment plan with patient including risks, benefits, alternatives, and side effects of medications, and any/all black box warnings. Patient verbalized understanding.  Patient had an opportunity to ask questions and address concerns. Obtained informed consent for treatment.  Medical records, labs, and diagnostic tests reviewed.   Psychiatry will continue to follow on consult service.  Please contact via [employer] in the interim for any acute changes or concerns   Thank you for this consult.  Discussed recommendations with Chelly Kenny MD at time of consult completion.    TelePsych recommendations:Medical Floor    Legal hold: Not Applicable    Please send message or call via [employer] if anything more is required.     Electronically signed by JULIAN Borges CNP on 5/27/2025 at 10:48 AM.    END OF NOTE  -------------------------            Rosalia Grant, was evaluated through a synchronous (real-time) audio-video encounter. The patient (and/or guardian if applicable) is aware that this is a billable service, which includes applicable co-pays. This virtual visit was conducted with patient's (and/or legal guardian's) consent. Patient identification was verified, and a caregiver was present when appropriate.  The patient was located at Facility (Appt Department): [facility] 4W MED SURG  [address] [address]  Loc: [phone]  The provider was located at Home (City/State): [facility]

## 2025-05-28 NOTE — PROGRESS NOTES
Patient discharged to home. Family here as ride. IV removed. Site WNL. Discharge paperwork and education provided. Medications reviewed. Script given to patients's daughter. All needs met and all questions answered.   Tara Trivedi RN

## 2025-05-28 NOTE — CARE COORDINATION
Case Management Discharge Note          Date / Time of Note: 5/28/2025 11:19 AM                  Patient Name: Rosalia Grant   YOB: 1940  Diagnosis: Hallucinations [R44.3]   Date / Time: 5/26/2025  9:20 PM    Financial:  Payor: MEDICARE / Plan: MEDICARE PART A AND B / Product Type: *No Product type* /      Pharmacy:  No Pharmacies Listed    Assistance purchasing medications?: Potential Assistance Purchasing Medications: No (Pt has all medications filled at [facility] [address])  Assistance provided by Case Management: None at this time    DISCHARGE Disposition: Home- No Services Needed    IMM Completed:   Yes, Case management has presented and reviewed IMM letter #2.       IMM Letter date given:: 05/26/25   .   Patient and/or family/POA verbalized understanding of their medicare rights and appeal process if needed. Patient and/or family/POA has signed, initialed and placed the date and time on IMM letter #2 on the the appropriate lines. Copy of letter offered and they are aware that the original copy of IMM letter #2 is available prior to discharge from the paper chart on the unit.  Electronic documentation has been entered into epic for IMM letter #2 and original paper copy has been added to the paper chart at the nurses station.     Additional CM Notes: Patient to DC home with family support. Denies any CM needs. Daughter will transport home.     The Plan for Transition of Care is related to the following treatment goals of Hallucinations [R44.3]    The Patient and/or patient representative Rosalia and her family were provided with a choice of provider and agrees with the discharge plan Yes    Freedom of choice list was provided with basic dialogue that supports the patient's individualized plan of care/goals and shares the quality data associated with the providers. Yes    Breonna Schultz, RN  [facility]   Case Management Department  Ph: [phone]  
Assistance needed at discharge: (P) N/A            Potential DME:  None noted  Patient expects to discharge to: (P) Other (comment) ([facility])  Plan for transportation at discharge: (P) Family (Dgtr to transport pt home at d/c)    Financial    Payor: MEDICARE / Plan: MEDICARE PART A AND B / Product Type: *No Product type* /     Does insurance require precert for SNF: No    Potential assistance Purchasing Medications: (P) No (Pt has all medications filled at [facility] [address])  Meds-to-Beds request: Yes    No Pharmacies Listed    Notes:    Factors facilitating achievement of predicted outcomes: Family support, Friend support, Motivated, Cooperative, Pleasant, and Good insight into deficits    Barriers to discharge: Anxiety    Additional Case Management Notes: Pt resides in a condo with her dgtr.  Pts dgtr explained that pt has no issues with her memory and has started experiencing hallucinations when going to sleep.  Pts stated she is fearful at night because she knows once she starts to fall asleep, she will hear and see things.  Pt is independent with all self care at home and dgtr plans to take pt back home at d/c.  Medical workup still in process.  Pt is not being recommended for an inpt psychiatric stay.  Dgtr will transport pt home at d/c.     PLAN: From home with dgtr.  Independent with all self care PTA.  Pt is not being recommended for inpt psych at this time.  Dgtr will transport pt home at d/c.     The Plan for Transition of Care is related to the following treatment goals of Hallucinations [R44.3]        OANH Sawyer  Case Management Department  Ph: [phone] Fax: [phone]  Electronically signed by OANH Sawyer on 5/27/2025 at 3:22 PM       05/27/25 [id number]   Service Assessment   Patient Orientation Alert and Oriented;Person;Place;Situation;Self   Cognition Alert   History Provided By Patient   Primary Caregiver Self   Accompanied By/Relationship dgtr, [place]ley   Support Systems